# Patient Record
Sex: MALE | Race: WHITE | Employment: UNEMPLOYED | URBAN - METROPOLITAN AREA
[De-identification: names, ages, dates, MRNs, and addresses within clinical notes are randomized per-mention and may not be internally consistent; named-entity substitution may affect disease eponyms.]

---

## 2017-02-20 ENCOUNTER — DOCUMENTATION ONLY (OUTPATIENT)
Dept: FAMILY MEDICINE CLINIC | Age: 47
End: 2017-02-20

## 2017-02-20 NOTE — PROGRESS NOTES
Called patient to reschedule appointment that he has scheduled for 3/2/17 because the provider will not be in the office. Unable to leave message because the number that is on the account is for a hotel and is not a direct line to the patients room. If patient calls, please reschedule the appointment for any day before or after, or in the [place] office.

## 2018-07-04 ENCOUNTER — DOCUMENTATION ONLY (OUTPATIENT)
Dept: FAMILY MEDICINE CLINIC | Age: 48
End: 2018-07-04

## 2022-08-31 ENCOUNTER — HOSPITAL ENCOUNTER (EMERGENCY)
Age: 52
Discharge: HOME | End: 2022-08-31
Attending: STUDENT IN AN ORGANIZED HEALTH CARE EDUCATION/TRAINING PROGRAM

## 2022-08-31 ENCOUNTER — APPOINTMENT (OUTPATIENT)
Dept: GENERAL RADIOLOGY | Age: 52
End: 2022-08-31
Attending: PHYSICIAN ASSISTANT

## 2022-08-31 VITALS
SYSTOLIC BLOOD PRESSURE: 124 MMHG | BODY MASS INDEX: 21.76 KG/M2 | DIASTOLIC BLOOD PRESSURE: 82 MMHG | OXYGEN SATURATION: 98 % | HEART RATE: 64 BPM | WEIGHT: 152 LBS | TEMPERATURE: 98 F | HEIGHT: 70 IN | RESPIRATION RATE: 18 BRPM

## 2022-08-31 DIAGNOSIS — S76.312A STRAIN OF LEFT HAMSTRING MUSCLE, INITIAL ENCOUNTER: Primary | ICD-10-CM

## 2022-08-31 PROCEDURE — 99283 EMERGENCY DEPT VISIT LOW MDM: CPT

## 2022-08-31 PROCEDURE — 73552 X-RAY EXAM OF FEMUR 2/>: CPT

## 2022-08-31 RX ORDER — NAPROXEN 500 MG/1
500 TABLET ORAL 2 TIMES DAILY WITH MEALS
Qty: 20 TABLET | Refills: 0 | Status: SHIPPED | OUTPATIENT
Start: 2022-08-31

## 2022-08-31 RX ORDER — METHOCARBAMOL 750 MG/1
750 TABLET, FILM COATED ORAL 4 TIMES DAILY
Qty: 16 TABLET | Refills: 0 | Status: SHIPPED | OUTPATIENT
Start: 2022-08-31

## 2022-08-31 NOTE — Clinical Note
Derick Henry was seen and treated in our emergency department on 8/31/2022. To whom it may concern:    Mr. Derick Henry was seen in the ED on 8/31/2022 and may be excused from work for 1 week.      Sincerely,     SISSY Burgos MD

## 2022-08-31 NOTE — ED TRIAGE NOTES
Patient arrived ambulatory to triage c/o left leg pain, possibly hamstring or some kind of torn muscle.

## 2022-08-31 NOTE — Clinical Note
Lashaun Tamayo was seen and treated in our emergency department on 8/31/2022. To whom it may concern:    Mr. Lashaun Tamayo was seen in the ED on 8/31/2022 and may be excused from work for 1 week.      Sincerely,     SISSY Moran MD

## 2022-09-01 NOTE — ED PROVIDER NOTES
EMERGENCY DEPARTMENT HISTORY AND PHYSICAL EXAM    Date: 8/31/2022  Patient Name: Thuan Ham    History of Presenting Illness     Chief Complaint   Patient presents with    Leg Pain         History Provided By: patient     Chief Complaint:leg pain   Duration: 2 months   Timing:  acute  Location: L hamstring   Quality: pulling   Severity: moderate  Modifying Factors: motrin has not helped   Associated Symptoms: none       Additional History (Context): Thuan Ham is a 47 y.o. male with PMH pancreatic cancer in remission, hep C, and IVDU who presents with c/o 2 months of pain to the L hamstring area. 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 Denies fever, chills, back pain, hip pain, knee pain, and swelling. He states he last used 3 weeks ago. Pain not alleviated with motrin. No other complaints reported. PCP: None    Current Outpatient Medications   Medication Sig Dispense Refill    methocarbamoL (Robaxin-750) 750 mg tablet Take 1 Tablet by mouth four (4) times daily. 16 Tablet 0    naproxen (NAPROSYN) 500 mg tablet Take 1 Tablet by mouth two (2) times daily (with meals). 20 Tablet 0    METHADONE HCL (METHADONE PO) Take  by mouth. Past History     Past Medical History:  Past Medical History:   Diagnosis Date    Hepatitis C virus 2010    not been treated. 12/2016- no show to GI appt    Liver metastases ([facility] [address] 2010    Pancreatic cancer [facility]) 2010    [facility] monitoring. S/P chemo       Past Surgical History:  No past surgical history on file.     Family History:  Family History   Problem Relation Age of Onset    Cancer Mother         melanoma    Other Father         ALS    No Known Problems Sister     Dementia Maternal Grandmother        Social History:  Social History     Tobacco Use    Smoking status: Every Day     Packs/day: 1.00     Types: Cigarettes   Substance Use Topics    Alcohol use: No    Drug use: Yes     Types: Opiates Allergies:  No Known Allergies      Review of Systems   Review of Systems   Constitutional: Negative. Negative for chills and fever. HENT: Negative. Negative for congestion, ear pain and rhinorrhea. Eyes: Negative. Negative for pain and redness. Respiratory: Negative. Negative for cough, shortness of breath, wheezing and stridor. Cardiovascular: Negative. Negative for chest pain and leg swelling. Gastrointestinal: Negative. Negative for abdominal pain, constipation, diarrhea, nausea and vomiting. Genitourinary: Negative. Negative for dysuria and frequency. Musculoskeletal:  Positive for myalgias. Negative for back pain and neck pain. Skin: Negative. Negative for rash and wound. Neurological: Negative. Negative for dizziness, seizures, syncope and headaches. All other systems reviewed and are negative. All Other Systems Negative  Physical Exam     Vitals:    08/31/22 1949   BP: 124/82   Pulse: 64   Resp: 18   Temp: 98 °F (36.7 °C)   SpO2: 98%   Weight: 68.9 kg (152 lb)   Height: 5' 10\" (1.778 m)     Physical Exam  Vitals and nursing note reviewed. Constitutional:       General: He is not in acute distress. Appearance: He is well-developed. He is not diaphoretic. HENT:      Head: Normocephalic and atraumatic. Eyes:      General: No scleral icterus. Right eye: No discharge. Left eye: No discharge. Conjunctiva/sclera: Conjunctivae normal.   Cardiovascular:      Rate and Rhythm: Normal rate and regular rhythm. Heart sounds: Normal heart sounds. No murmur heard. No friction rub. No gallop. Pulmonary:      Effort: Pulmonary effort is normal. No respiratory distress. Breath sounds: Normal breath sounds. No stridor. No wheezing, rhonchi or rales. Musculoskeletal:         General: Normal range of motion. Cervical back: Normal range of motion and neck supple.       Comments: No point bony TTP noted on exam, TTP noted along the L hamstring, FROM of the LLE intact, intact distal pulses. Able to ambulate without difficulty. Skin:     General: Skin is warm and dry. Findings: No erythema or rash. Neurological:      General: No focal deficit present. Mental Status: He is alert and oriented to person, place, and time. Mental status is at baseline. Coordination: Coordination normal.      Comments: Gait is steady and patient exhibits no evidence of ataxia. Patient is able to ambulate without difficulty. No focal neurological deficit noted. No facial droop, slurred speech, or evidence of altered mentation noted on exam.       Psychiatric:         Behavior: Behavior normal.         Thought Content: Thought content normal.              Diagnostic Study Results     Labs -   No results found for this or any previous visit (from the past 12 hour(s)). Radiologic Studies -   XR FEMUR LT 2 V    (Results Pending)     CT Results  (Last 48 hours)      None          CXR Results  (Last 48 hours)      None              Medical Decision Making   I am the first provider for this patient. I reviewed the vital signs, available nursing notes, past medical history, past surgical history, family history and social history. Vital Signs-Reviewed the patient's vital signs. Records Reviewed: Laura Dior PA-C     Procedures:  Procedures    Provider Notes (Medical Decision Making): Impression: hamstring strain    X-rays negative for acute process, I have consulted with ED Attending Dr. Katelin Stbubs and discussed this pt. Although the pt has a hx of IDU he has no fever, chills, or clinical evidence to suggest infectious process. I have discussed this with the pt at bedside. Will plan to d.c with robaxin and naproxen, close ortho follow-up recommended, return precautions advised. Pt agrees. Laura Dior PA-C        MED RECONCILIATION:  No current facility-administered medications for this encounter.      Current Outpatient Medications   Medication Sig methocarbamoL (Robaxin-750) 750 mg tablet Take 1 Tablet by mouth four (4) times daily. naproxen (NAPROSYN) 500 mg tablet Take 1 Tablet by mouth two (2) times daily (with meals). METHADONE HCL (METHADONE PO) Take  by mouth. Disposition:  D/c    DISCHARGE NOTE:   Patient is stable for discharge at this time. I have discussed all the findings from today's work up with the patient, including lab results and imaging. I have answered all questions. Rx for Robaxin and naproxen given. Rest and close follow-up with the PCP recommended this week. Return to the ED immediately for any new or worsening symptoms. Laura Dior PA-C     Follow-up Information       Follow up With Specialties Details Why [address]  In 3 days  [address] [facility]  [address]  [phone]    [facility] EMERGENCY DEPT Emergency Medicine  As needed, If symptoms worsen [address] [facility] [address]  [phone]            Current Discharge Medication List        START taking these medications    Details   methocarbamoL (Robaxin-750) 750 mg tablet Take 1 Tablet by mouth four (4) times daily. Qty: 16 Tablet, Refills: 0  Start date: 8/31/2022      naproxen (NAPROSYN) 500 mg tablet Take 1 Tablet by mouth two (2) times daily (with meals). Qty: 20 Tablet, Refills: 0  Start date: 8/31/2022                 Diagnosis     Clinical Impression:   1.  Strain of left hamstring muscle, initial encounter

## 2022-09-01 NOTE — DISCHARGE INSTRUCTIONS
[employer] Activation    Thank you for requesting access to [employer]. Please follow the instructions below to securely access and download your online medical record. [employer] allows you to send messages to your doctor, view your test results, renew your prescriptions, schedule appointments, and more. How Do I Sign Up? In your internet browser, go to [email or web address]  Click on the First Time User? Click Here link in the Sign In box. You will be redirect to the New Member Sign Up page. Enter your [employer] Access Code exactly as it appears below. You will not need to use this code after youve completed the sign-up process. If you do not sign up before the expiration date, you must request a new code. [employer] Access Code: [email or web address]  Expires: 10/15/2022  7:47 PM (This is the date your [employer] access code will )    Enter the last four digits of your Social Security Number (xxxx) and Date of Birth (mm/dd/yyyy) as indicated and click Submit. You will be taken to the next sign-up page. Create a [employer] ID. This will be your [employer] login ID and cannot be changed, so think of one that is secure and easy to remember. Create a [employer] password. You can change your password at any time. Enter your Password Reset Question and Answer. This can be used at a later time if you forget your password. Enter your e-mail address. You will receive e-mail notification when new information is available in [address] Click Sign Up. You can now view and download portions of your medical record. Click the [employer] link to download a portable copy of your medical information. Additional Information    If you have questions, please visit the Frequently Asked Questions section of the [employer] website at [email or web address] Remember, [employer] is NOT to be used for urgent needs. For medical emergencies, dial 911.